# Patient Record
Sex: MALE | Race: WHITE | NOT HISPANIC OR LATINO | Employment: UNEMPLOYED | URBAN - METROPOLITAN AREA
[De-identification: names, ages, dates, MRNs, and addresses within clinical notes are randomized per-mention and may not be internally consistent; named-entity substitution may affect disease eponyms.]

---

## 2017-02-18 ENCOUNTER — ALLSCRIPTS OFFICE VISIT (OUTPATIENT)
Dept: OTHER | Facility: OTHER | Age: 22
End: 2017-02-18

## 2017-09-16 ENCOUNTER — GENERIC CONVERSION - ENCOUNTER (OUTPATIENT)
Dept: OTHER | Facility: OTHER | Age: 22
End: 2017-09-16

## 2018-01-13 VITALS
OXYGEN SATURATION: 99 % | DIASTOLIC BLOOD PRESSURE: 74 MMHG | RESPIRATION RATE: 16 BRPM | TEMPERATURE: 98.4 F | BODY MASS INDEX: 22.53 KG/M2 | WEIGHT: 185 LBS | HEART RATE: 71 BPM | SYSTOLIC BLOOD PRESSURE: 104 MMHG | HEIGHT: 76 IN

## 2018-01-22 VITALS
DIASTOLIC BLOOD PRESSURE: 72 MMHG | OXYGEN SATURATION: 98 % | WEIGHT: 192.4 LBS | BODY MASS INDEX: 23.42 KG/M2 | SYSTOLIC BLOOD PRESSURE: 110 MMHG | TEMPERATURE: 97.7 F | RESPIRATION RATE: 18 BRPM | HEART RATE: 75 BPM

## 2018-08-25 ENCOUNTER — OFFICE VISIT (OUTPATIENT)
Dept: URGENT CARE | Facility: CLINIC | Age: 23
End: 2018-08-25
Payer: COMMERCIAL

## 2018-08-25 VITALS
WEIGHT: 188 LBS | HEART RATE: 62 BPM | DIASTOLIC BLOOD PRESSURE: 64 MMHG | RESPIRATION RATE: 16 BRPM | HEIGHT: 76 IN | SYSTOLIC BLOOD PRESSURE: 98 MMHG | OXYGEN SATURATION: 100 % | BODY MASS INDEX: 22.89 KG/M2 | TEMPERATURE: 97.2 F

## 2018-08-25 DIAGNOSIS — J06.9 ACUTE URI: Primary | ICD-10-CM

## 2018-08-25 DIAGNOSIS — B07.8 OTHER VIRAL WARTS: ICD-10-CM

## 2018-08-25 PROCEDURE — 99213 OFFICE O/P EST LOW 20 MIN: CPT | Performed by: PHYSICIAN ASSISTANT

## 2018-08-25 RX ORDER — AZITHROMYCIN 250 MG/1
TABLET, FILM COATED ORAL
Qty: 6 TABLET | Refills: 0 | Status: SHIPPED | OUTPATIENT
Start: 2018-08-25 | End: 2018-08-30

## 2018-08-25 NOTE — PATIENT INSTRUCTIONS
Take antibiotic as directed with food and water  While on this medication begin a probiotic such as yogurt  Continue a decongestant, taking regularly, as directed  Advil or Tylenol as directed for muscle aches  Some decongestants contain ibuprofen/tylenol, monitor dosing and product ingredients to prevent taking to much of these medications  Use a nasal steroid such as Flonase  Use cool mist humidifier, turning on hours prior to bedtime for maximum relief  Take all medications with food and a full glass of water  Get plenty of rest and lots of fluids  Use throat lozenges, saltwater gargle, and warm tea with honey as needed for throat relief  Follow up with your family doctor in 5-7 days  Proceed to the ER if symptoms worsen

## 2018-08-25 NOTE — PROGRESS NOTES
[facility]        NAME: Pa Mai is a 20 y o  male  : 1995    MRN: [id number]  DATE: 2018  TIME: 8:59 AM    Assessment and Plan   Acute URI [J06 9]  1  Acute URI  azithromycin (ZITHROMAX) 250 mg tablet   2  Other viral warts           Patient Instructions   Take antibiotic as directed with food and water  While on this medication begin a probiotic such as yogurt  Continue a decongestant, taking regularly, as directed  Advil or Tylenol as directed for muscle aches  Some decongestants contain ibuprofen/tylenol, monitor dosing and product ingredients to prevent taking to much of these medications  Use a nasal steroid such as Flonase  Use cool mist humidifier, turning on hours prior to bedtime for maximum relief  Take all medications with food and a full glass of water  Get plenty of rest and lots of fluids  Use throat lozenges, saltwater gargle, and warm tea with honey as needed for throat relief  Follow up with your family doctor in 5-7 days  Proceed to the ER if symptoms worsen  Notified that antibiotic would remain in system even after course completion  Patient did request to have wart removed in the office  Discussed inability to do so  Advised that he try an over-the-counter remedy  Reviewed that he could make a paced using a crushed aspirin tablet and cover with duct tape, however, that this would take an extended time to remove  Alternative of seeking removal by his PCP was offered  All questions answered  Chief Complaint     Chief Complaint   Patient presents with    Cold Like Symptoms     pt states he has stuffy,runny nose, left ear pressure, sinus pressure, teeth hurt; denies fever, chills or sweats; onset Monday with early symptoms , sinus symptoms onset 2 days ago     History of Present Illness       57-year-old male presenting with complaint of sinus infection x5 days   Patient notes symptoms began as mild nasal congestion, runny nose, and bilateral ear pressure, however progressed in severity to now include facial pressure, worsening congestion, and dry cough  Ear pain is worse on the left  He has tried treating at bedtime with NyQuil with minimal relief  He did recently returned home from a trip  No sick contacts  No fevers, chills, fatigue, headache, or other URI/GI symptoms  He is also requesting to have a wart removed from the base of his right thumb  Review of Systems   Review of Systems   Constitutional: Negative for chills, diaphoresis, fatigue and fever  Respiratory: Negative for cough, shortness of breath and wheezing  Cardiovascular: Negative for chest pain and palpitations  Gastrointestinal: Negative for abdominal pain, diarrhea, nausea and vomiting  Musculoskeletal: Negative for arthralgias and myalgias  Skin: Negative for rash  Neurological: Negative for dizziness, light-headedness and headaches  Current Medications       Current Outpatient Prescriptions:     azithromycin (ZITHROMAX) 250 mg tablet, Take two tablets on day one, then one tablet daily  , Disp: 6 tablet, Rfl: 0    Current Allergies     Allergies as of 08/25/2018 - Reviewed 08/25/2018   Allergen Reaction Noted    Penicillins  02/18/2017            The following portions of the patient's history were reviewed and updated as appropriate: allergies, current medications, past family history, past medical history, past social history, past surgical history and problem list      Past Medical History:   Diagnosis Date    Patient denies medical problems        Past Surgical History:   Procedure Laterality Date    MASTECTOMY FOR GYNECOMASTIA      x 3       Family History   Problem Relation Age of Onset    No Known Problems Mother     Supraventricular tachycardia Father      Medications have been verified      Objective   BP 98/64   Pulse 62   Temp (!) 97 2 °F (36 2 °C)   Resp 16   Ht 6' 4" (1 93 m)   Wt 85 3 kg (188 lb)   SpO2 100%   BMI 22 88 kg/m²      Physical Exam Physical Exam   Constitutional: He is oriented to person, place, and time  He appears well-developed and well-nourished  No distress  HENT:   Head: Normocephalic and atraumatic  Right Ear: Tympanic membrane, external ear and ear canal normal    Left Ear: Tympanic membrane, external ear and ear canal normal    Nose: Mucosal edema and rhinorrhea present  Mouth/Throat: Uvula is midline and mucous membranes are normal  Mucous membranes are not pale and not dry  Posterior oropharyngeal erythema (mildly injected) present  No oropharyngeal exudate or posterior oropharyngeal edema  Near complete cerumen impaction b/l  White PND present  Eyes: Conjunctivae are normal    Neck: Neck supple  Cardiovascular: Normal rate, regular rhythm and normal heart sounds  Pulmonary/Chest: Effort normal and breath sounds normal  No respiratory distress  He has no decreased breath sounds  He has no wheezes  He has no rhonchi  He has no rales  Lymphadenopathy:     He has cervical adenopathy (NT)  Neurological: He is alert and oriented to person, place, and time  No cranial nerve deficit  He exhibits normal muscle tone  Coordination normal    Skin: Skin is warm and dry  No rash noted  He is not diaphoretic  Flesh colored 4 mm papule overlying the radial aspect of the right thumb, just distal to the 1st MCP  Psychiatric: He has a normal mood and affect  His behavior is normal    Nursing note and vitals reviewed

## 2021-02-11 DIAGNOSIS — Z23 ENCOUNTER FOR IMMUNIZATION: ICD-10-CM

## 2021-11-22 ENCOUNTER — OFFICE VISIT (OUTPATIENT)
Dept: URGENT CARE | Facility: CLINIC | Age: 26
End: 2021-11-22
Payer: COMMERCIAL

## 2021-11-22 VITALS
WEIGHT: 188 LBS | OXYGEN SATURATION: 99 % | RESPIRATION RATE: 16 BRPM | BODY MASS INDEX: 22.89 KG/M2 | HEIGHT: 76 IN | HEART RATE: 82 BPM | TEMPERATURE: 97.6 F

## 2021-11-22 DIAGNOSIS — H10.33 ACUTE CONJUNCTIVITIS OF BOTH EYES, UNSPECIFIED ACUTE CONJUNCTIVITIS TYPE: ICD-10-CM

## 2021-11-22 DIAGNOSIS — J06.9 ACUTE URI: Primary | ICD-10-CM

## 2021-11-22 PROBLEM — D17.24 LIPOMA OF LEFT LOWER EXTREMITY: Status: ACTIVE | Noted: 2017-02-18

## 2021-11-22 PROCEDURE — 99213 OFFICE O/P EST LOW 20 MIN: CPT | Performed by: FAMILY MEDICINE

## 2021-11-22 RX ORDER — FLUTICASONE PROPIONATE 50 MCG
1 SPRAY, SUSPENSION (ML) NASAL DAILY
Qty: 9.9 ML | Refills: 0 | Status: SHIPPED | OUTPATIENT
Start: 2021-11-22

## 2021-11-22 RX ORDER — POLYMYXIN B SULFATE AND TRIMETHOPRIM 1; 10000 MG/ML; [USP'U]/ML
1 SOLUTION OPHTHALMIC EVERY 4 HOURS
Qty: 10 ML | Refills: 0 | Status: SHIPPED | OUTPATIENT
Start: 2021-11-22 | End: 2021-11-29

## 2021-11-22 RX ORDER — ADAPALENE AND BENZOYL PEROXIDE 3; 25 MG/G; MG/G
GEL TOPICAL
COMMUNITY